# Patient Record
Sex: MALE | Race: WHITE | NOT HISPANIC OR LATINO | Employment: OTHER | URBAN - METROPOLITAN AREA
[De-identification: names, ages, dates, MRNs, and addresses within clinical notes are randomized per-mention and may not be internally consistent; named-entity substitution may affect disease eponyms.]

---

## 2018-02-23 ENCOUNTER — OFFICE VISIT (OUTPATIENT)
Dept: NEUROLOGY | Facility: CLINIC | Age: 68
End: 2018-02-23
Payer: COMMERCIAL

## 2018-02-23 VITALS
HEART RATE: 98 BPM | BODY MASS INDEX: 29.93 KG/M2 | DIASTOLIC BLOOD PRESSURE: 90 MMHG | SYSTOLIC BLOOD PRESSURE: 142 MMHG | WEIGHT: 221 LBS | HEIGHT: 72 IN

## 2018-02-23 DIAGNOSIS — Z86.73 HISTORY OF STROKE: Primary | ICD-10-CM

## 2018-02-23 DIAGNOSIS — R56.9 FOCAL SEIZURES (HCC): ICD-10-CM

## 2018-02-23 PROCEDURE — 99245 OFF/OP CONSLTJ NEW/EST HI 55: CPT | Performed by: PSYCHIATRY & NEUROLOGY

## 2018-02-23 RX ORDER — PANTOPRAZOLE SODIUM 40 MG/1
40 TABLET, DELAYED RELEASE ORAL DAILY
COMMUNITY

## 2018-02-23 RX ORDER — ATORVASTATIN CALCIUM 20 MG/1
20 TABLET, FILM COATED ORAL DAILY
COMMUNITY

## 2018-02-23 RX ORDER — BACLOFEN 10 MG/1
10 TABLET ORAL 3 TIMES DAILY
COMMUNITY

## 2018-02-23 RX ORDER — DOCUSATE SODIUM 100 MG/1
100 CAPSULE, LIQUID FILLED ORAL
COMMUNITY

## 2018-02-23 RX ORDER — LISINOPRIL 10 MG/1
10 TABLET ORAL DAILY
COMMUNITY

## 2018-02-23 RX ORDER — LEVETIRACETAM 500 MG/1
500 TABLET ORAL 2 TIMES DAILY
COMMUNITY

## 2018-02-23 RX ORDER — SENNA PLUS 8.6 MG/1
1 TABLET ORAL
COMMUNITY

## 2018-02-23 NOTE — PATIENT INSTRUCTIONS
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, but overall from a functional standpoint seems to be doing quite well  He recently experienced a very brief episode where he had paresthesias of the left lower lip which may have been accompanied by a head turn to the left along with some twitching of the muscles in his left arm and potentially shoulders  There was no alteration in his level of consciousness  [facility] [place] [facility] [place] and reportedly had a repeat MRI which did not reveal any new stroke symptoms  His overall stroke risk factors are quite well controlled at this point in time  - for secondary stroke prevention I agree with his current combination of aspirin, atorvastatin, and appropriate blood pressure control  -I will continue to defer to his family physician in terms of monitoring of his cholesterol and blood sugars     -I am in agreement that he may benefit from Botox for the left upper extremity as this will help to alleviate pain to a degree and may assist him in positioning of the limb to improve his overall functionality     -I do think he would benefit from a course of physical and occupational therapy after the initiation of Botox which will happen summer of 2018   -based on his clinical description I agree that it is possible that his event does represent a focal seizure, originating from his stroke bed  At this point he is experiencing fatigue which may be related to [address] but also may be somewhat related to the environment this point in time    I would suggest that if his fatigue does not resolve over the next 2-4 weeks would be reasonable to consider a slow wean from the [address] in order to determine if he is symptomatic, and if he is having side effects to consider a different antiseizure medication     - he will have ongoing risk to have a breakthrough seizure event therefore it will be reasonable to consider antiseizure medication long-term as long as he is not having significant side effects  -I would encourage him to continue exercising as much as possible  I will plan  For him to return to the office on an as-needed basis considering the distance from his home  I would be happy to answer any questions by phone if he has any concerns at all  We would be happy to see him at any time, however if he has any new stroke-like symptoms including sudden painless loss of vision, new difficulty with speaking or swallowing, sudden weakness/ numbness on 1 side of the body, or vertigo / room spinning that does not quickly resolved he should proceed to the nearest emergency room immediately  Similarly, his neurologist did leave him with instructions with regard to seizure precautions including only swimming when he is supervised, preferring showers as opposed to baths, etc   I feel that this is reasonable for him    Help

## 2018-02-23 NOTE — PROGRESS NOTES
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, but overall from a functional standpoint seems to be doing quite well  He recently experienced a very brief episode where he had paresthesias of the left lower lip which may have been accompanied by a head turn to the left along with some twitching of the muscles in his left arm and potentially shoulders  There was no alteration in his level of consciousness  He was seen at the hospital in [facility] and reportedly had a repeat MRI which did not reveal any new stroke symptoms  His overall stroke risk factors are quite well controlled at this point in time      - for secondary stroke prevention I agree with his current combination of aspirin, atorvastatin, and appropriate blood pressure control  -I will continue to defer to his family physician in terms of monitoring of his cholesterol and blood sugars     -I am in agreement that he may benefit from Botox for the left upper extremity as this will help to alleviate pain to a degree and may assist him in positioning of the limb to improve his overall functionality     -I do think he would benefit from a course of physical and occupational therapy after the initiation of Botox which will happen summer of 2018   -based on his clinical description I agree that it is possible that his event does represent a focal seizure, originating from his stroke bed   At this point he is experiencing fatigue which may be related to [address] but also may be somewhat related to the environment this point in time  I would suggest that if his fatigue does not resolve over the next 2-4 weeks would be reasonable to consider a slow wean from the [address] in order to determine if he is symptomatic, and if he is having side effects to consider a different antiseizure medication     - he will have ongoing risk to have a breakthrough seizure event therefore it will be reasonable to consider antiseizure medication long-term as long as he is not having significant side effects  -I would encourage him to continue exercising as much as possible  I will plan  For him to return to the office on an as-needed basis considering the distance from his home  I would be happy to answer any questions by phone if he has any concerns at all  We would be happy to see him at any time, however if he has any new stroke-like symptoms including sudden painless loss of vision, new difficulty with speaking or swallowing, sudden weakness/ numbness on 1 side of the body, or vertigo / room spinning that does not quickly resolved he should proceed to the nearest emergency room immediately  Similarly, his neurologist did leave him with instructions with regard to seizure precautions including only swimming when he is supervised, preferring showers as opposed to baths, etc   I feel that this is reasonable for him  Help        Subjective:    HPI     Gordon Law is a 73-year-old man who does have minimal vascular risk factors who apparently experienced a right anterior circulation stroke 14 months ago as result of a right internal carotid artery dissection  The dissection has rendered him with a completely occluded right internal carotid artery which has been persistent  He also was 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    He is maintained on a combination of aspirin, atorvastatin, and blood pressure control  He has residual left esau spasticity but seems to do quite well functionally  He has currently graduated from therapy services but is pending a Botox to the left upper extremity in June of 2018 ( he does experience pain in the left upper extremity as well as posturing while he walks)  In early February of 2018 he experienced an episode while he was eating dinner with his wife  It began with left lower lip paresthesias which he has never experienced before  He noted some muscle twitching of the left upper extremity which he has experienced before  He reports that he turned his head to the left to try and see what was wrong but his wife reports that it was somewhat of an unnatural posture and did not fully look voluntary  She also reports that he had some twitching movements of the shoulders bilaterally  The both confirm that there was no alteration in his level of consciousness  He did go to the emergency room at [facility]  His symptoms had  Already resolved by that point in time ( they had lasted for approximately 30 seconds)  Reportedly an MRI of the brain was unremarkable and it was felt that he had experienced a focal motor seizure  Subsequently he saw his neurologist who started him on Keppra  He notes that for the last few weeks he has been significantly fatigued  He confirms that he has not been driving overall, although this is not necessarily result of his seizure  I did review his notes from [facility] through the [facility] including his most recent Neurology visit, most recent cholesterol panel and hemoglobin A1c, and some imaging studies  Objective: There were no vitals taken for this visit  Physical Exam    Neurological Exam      At the time of my evaluation Yulissa Bautista was awake, alert, and possess normal language in mental status function  Cranial nerves 2-12 were symmetrically intact    He does have somewhat of a decrease in his cervical range of motion with head turn to the right with slight exaggeration his sternocleidomastoid on that side which may be result of his left-sided spasticity  He does have spastic weakness of the left upper extremity with 4/5 strength and more significant decrease in his range of motion at the level of the shoulder and with supination  He has less spasticity but still some weakness in the left lower extremity  His gait is hemiparetic  Light touch sensation is intact in the bilateral upper and lower extremities  ROS:    Review of Systems   Constitutional: Negative  Negative for appetite change and fever  HENT: Negative  Negative for hearing loss, tinnitus, trouble swallowing and voice change  Eyes: Negative  Negative for photophobia and pain  Respiratory: Negative  Negative for shortness of breath  Cardiovascular: Negative  Negative for palpitations  Gastrointestinal: Negative  Negative for nausea and vomiting  Endocrine: Negative  Negative for cold intolerance and heat intolerance  Genitourinary: Negative  Negative for dysuria, frequency and urgency  Musculoskeletal: Negative  Negative for myalgias and neck pain  Skin: Negative  Negative for rash  Neurological: Negative  Negative for dizziness, tremors, seizures, syncope, facial asymmetry, speech difficulty, weakness, light-headedness, numbness and headaches  Hematological: Negative  Does not bruise/bleed easily  Psychiatric/Behavioral: Negative  Negative for confusion, hallucinations and sleep disturbance           Total encounter time  60 minutes